# Patient Record
Sex: MALE | Race: WHITE | Employment: OTHER | URBAN - METROPOLITAN AREA
[De-identification: names, ages, dates, MRNs, and addresses within clinical notes are randomized per-mention and may not be internally consistent; named-entity substitution may affect disease eponyms.]

---

## 2017-06-05 ENCOUNTER — HOSPITAL ENCOUNTER (INPATIENT)
Facility: HOSPITAL | Age: 67
LOS: 3 days | Discharge: HOME | DRG: 246 | End: 2017-06-08
Attending: EMERGENCY MEDICINE | Admitting: HOSPITALIST
Payer: MEDICARE

## 2017-06-05 ENCOUNTER — APPOINTMENT (OUTPATIENT)
Dept: GENERAL RADIOLOGY | Facility: HOSPITAL | Age: 67
DRG: 246 | End: 2017-06-05
Attending: EMERGENCY MEDICINE
Payer: MEDICARE

## 2017-06-05 ENCOUNTER — APPOINTMENT (OUTPATIENT)
Dept: INTERVENTIONAL RADIOLOGY/VASCULAR | Facility: HOSPITAL | Age: 67
DRG: 246 | End: 2017-06-05
Attending: INTERNAL MEDICINE
Payer: MEDICARE

## 2017-06-05 DIAGNOSIS — I25.10 CAD (CORONARY ARTERY DISEASE): ICD-10-CM

## 2017-06-05 DIAGNOSIS — I21.4 ACUTE NON-ST ELEVATION MYOCARDIAL INFARCTION (NSTEMI) (HCC): ICD-10-CM

## 2017-06-05 DIAGNOSIS — R07.9 CHEST PAIN: ICD-10-CM

## 2017-06-05 DIAGNOSIS — R07.9 ACUTE CHEST PAIN: Primary | ICD-10-CM

## 2017-06-05 PROCEDURE — 4A023N7 MEASUREMENT OF CARDIAC SAMPLING AND PRESSURE, LEFT HEART, PERCUTANEOUS APPROACH: ICD-10-PCS | Performed by: INTERNAL MEDICINE

## 2017-06-05 PROCEDURE — 027034Z DILATION OF CORONARY ARTERY, ONE ARTERY WITH DRUG-ELUTING INTRALUMINAL DEVICE, PERCUTANEOUS APPROACH: ICD-10-PCS | Performed by: INTERNAL MEDICINE

## 2017-06-05 PROCEDURE — 71010 XR CHEST AP PORTABLE  (CPT=71010): CPT | Performed by: EMERGENCY MEDICINE

## 2017-06-05 PROCEDURE — 99223 1ST HOSP IP/OBS HIGH 75: CPT | Performed by: HOSPITALIST

## 2017-06-05 PROCEDURE — B211YZZ FLUOROSCOPY OF MULTIPLE CORONARY ARTERIES USING OTHER CONTRAST: ICD-10-PCS | Performed by: INTERNAL MEDICINE

## 2017-06-05 PROCEDURE — B240ZZ3 ULTRASONOGRAPHY OF SINGLE CORONARY ARTERY, INTRAVASCULAR: ICD-10-PCS | Performed by: INTERNAL MEDICINE

## 2017-06-05 PROCEDURE — 02C03ZZ EXTIRPATION OF MATTER FROM CORONARY ARTERY, ONE ARTERY, PERCUTANEOUS APPROACH: ICD-10-PCS | Performed by: INTERNAL MEDICINE

## 2017-06-05 RX ORDER — CETIRIZINE HYDROCHLORIDE 10 MG/1
10 TABLET ORAL DAILY
COMMUNITY

## 2017-06-05 RX ORDER — MYCOPHENOLATE MOFETIL 250 MG/1
250 CAPSULE ORAL 2 TIMES DAILY
Status: DISCONTINUED | OUTPATIENT
Start: 2017-06-05 | End: 2017-06-08

## 2017-06-05 RX ORDER — HEPARIN SODIUM AND DEXTROSE 10000; 5 [USP'U]/100ML; G/100ML
1000 INJECTION INTRAVENOUS ONCE
Status: COMPLETED | OUTPATIENT
Start: 2017-06-05 | End: 2017-06-05

## 2017-06-05 RX ORDER — NITROGLYCERIN 0.4 MG/1
0.4 TABLET SUBLINGUAL EVERY 5 MIN PRN
Status: DISCONTINUED | OUTPATIENT
Start: 2017-06-05 | End: 2017-06-08

## 2017-06-05 RX ORDER — MIDAZOLAM HYDROCHLORIDE 1 MG/ML
INJECTION INTRAMUSCULAR; INTRAVENOUS
Status: COMPLETED
Start: 2017-06-05 | End: 2017-06-05

## 2017-06-05 RX ORDER — ATORVASTATIN CALCIUM 40 MG/1
40 TABLET, FILM COATED ORAL NIGHTLY
Status: DISCONTINUED | OUTPATIENT
Start: 2017-06-05 | End: 2017-06-08

## 2017-06-05 RX ORDER — ACETAMINOPHEN 325 MG/1
650 TABLET ORAL EVERY 6 HOURS PRN
Status: DISCONTINUED | OUTPATIENT
Start: 2017-06-05 | End: 2017-06-08

## 2017-06-05 RX ORDER — PRASUGREL 10 MG/1
10 TABLET, FILM COATED ORAL DAILY
Status: DISCONTINUED | OUTPATIENT
Start: 2017-06-06 | End: 2017-06-08

## 2017-06-05 RX ORDER — LIDOCAINE HYDROCHLORIDE 10 MG/ML
INJECTION, SOLUTION INFILTRATION; PERINEURAL
Status: COMPLETED
Start: 2017-06-05 | End: 2017-06-05

## 2017-06-05 RX ORDER — CHOLECALCIFEROL (VITAMIN D3) 50 MCG
2000 TABLET ORAL DAILY
COMMUNITY

## 2017-06-05 RX ORDER — TAMSULOSIN HYDROCHLORIDE 0.4 MG/1
0.4 CAPSULE ORAL DAILY
COMMUNITY

## 2017-06-05 RX ORDER — ALFUZOSIN HYDROCHLORIDE 10 MG/1
10 TABLET, EXTENDED RELEASE ORAL DAILY
Status: DISCONTINUED | OUTPATIENT
Start: 2017-06-06 | End: 2017-06-08

## 2017-06-05 RX ORDER — GABAPENTIN 300 MG/1
100 CAPSULE ORAL 3 TIMES DAILY
Status: ON HOLD | COMMUNITY
End: 2017-06-06

## 2017-06-05 RX ORDER — NITROGLYCERIN 20 MG/100ML
INJECTION INTRAVENOUS CONTINUOUS
Status: DISCONTINUED | OUTPATIENT
Start: 2017-06-05 | End: 2017-06-05

## 2017-06-05 RX ORDER — DEXTROSE MONOHYDRATE 25 G/50ML
50 INJECTION, SOLUTION INTRAVENOUS
Status: DISCONTINUED | OUTPATIENT
Start: 2017-06-05 | End: 2017-06-08

## 2017-06-05 RX ORDER — HEPARIN SODIUM AND DEXTROSE 10000; 5 [USP'U]/100ML; G/100ML
INJECTION INTRAVENOUS CONTINUOUS
Status: CANCELLED | OUTPATIENT
Start: 2017-06-05

## 2017-06-05 RX ORDER — ASPIRIN 81 MG/1
324 TABLET, CHEWABLE ORAL ONCE
Status: COMPLETED | OUTPATIENT
Start: 2017-06-05 | End: 2017-06-05

## 2017-06-05 RX ORDER — GABAPENTIN 100 MG/1
100 CAPSULE ORAL 3 TIMES DAILY
Status: DISCONTINUED | OUTPATIENT
Start: 2017-06-05 | End: 2017-06-08

## 2017-06-05 RX ORDER — TACROLIMUS 1 MG/1
1 CAPSULE ORAL 3 TIMES DAILY
Status: DISCONTINUED | OUTPATIENT
Start: 2017-06-05 | End: 2017-06-07

## 2017-06-05 RX ORDER — PRASUGREL 10 MG/1
TABLET, FILM COATED ORAL
Status: COMPLETED
Start: 2017-06-05 | End: 2017-06-05

## 2017-06-05 RX ORDER — MYCOPHENOLATE MOFETIL 250 MG/1
250 CAPSULE ORAL 2 TIMES DAILY
COMMUNITY

## 2017-06-05 RX ORDER — TACROLIMUS 1 MG/1
2 CAPSULE ORAL EVERY MORNING
COMMUNITY

## 2017-06-05 RX ORDER — INSULIN LISPRO 100 [IU]/ML
INJECTION, SOLUTION INTRAVENOUS; SUBCUTANEOUS
COMMUNITY

## 2017-06-05 RX ORDER — SODIUM CHLORIDE 9 MG/ML
INJECTION, SOLUTION INTRAVENOUS CONTINUOUS
Status: ACTIVE | OUTPATIENT
Start: 2017-06-05 | End: 2017-06-06

## 2017-06-05 RX ORDER — HEPARIN SODIUM 5000 [USP'U]/ML
INJECTION, SOLUTION INTRAVENOUS; SUBCUTANEOUS
Status: COMPLETED
Start: 2017-06-05 | End: 2017-06-05

## 2017-06-05 RX ORDER — NITROGLYCERIN 0.4 MG/1
0.4 TABLET SUBLINGUAL EVERY 5 MIN PRN
COMMUNITY

## 2017-06-05 RX ORDER — CARVEDILOL 6.25 MG/1
6.25 TABLET ORAL 2 TIMES DAILY WITH MEALS
Status: DISCONTINUED | OUTPATIENT
Start: 2017-06-05 | End: 2017-06-08

## 2017-06-05 RX ORDER — ASPIRIN 81 MG/1
81 TABLET ORAL DAILY
Status: DISCONTINUED | OUTPATIENT
Start: 2017-06-06 | End: 2017-06-08

## 2017-06-05 RX ORDER — HEPARIN SODIUM 5000 [USP'U]/ML
5000 INJECTION INTRAVENOUS; SUBCUTANEOUS ONCE
Status: COMPLETED | OUTPATIENT
Start: 2017-06-05 | End: 2017-06-05

## 2017-06-05 RX ORDER — SODIUM CHLORIDE 9 MG/ML
INJECTION, SOLUTION INTRAVENOUS CONTINUOUS
Status: DISCONTINUED | OUTPATIENT
Start: 2017-06-05 | End: 2017-06-05

## 2017-06-05 RX ORDER — ONDANSETRON 2 MG/ML
8 INJECTION INTRAMUSCULAR; INTRAVENOUS EVERY 6 HOURS PRN
Status: DISCONTINUED | OUTPATIENT
Start: 2017-06-05 | End: 2017-06-08

## 2017-06-05 RX ORDER — TACROLIMUS 1 MG/1
1 CAPSULE ORAL NIGHTLY
COMMUNITY

## 2017-06-05 RX ORDER — ATORVASTATIN CALCIUM 40 MG/1
40 TABLET, FILM COATED ORAL NIGHTLY
COMMUNITY

## 2017-06-05 RX ORDER — TRAZODONE HYDROCHLORIDE 50 MG/1
50 TABLET ORAL NIGHTLY PRN
Status: DISCONTINUED | OUTPATIENT
Start: 2017-06-05 | End: 2017-06-08

## 2017-06-05 RX ORDER — FUROSEMIDE 40 MG/1
20 TABLET ORAL 2 TIMES DAILY
COMMUNITY

## 2017-06-05 RX ORDER — FUROSEMIDE 10 MG/ML
INJECTION INTRAMUSCULAR; INTRAVENOUS
Status: COMPLETED
Start: 2017-06-05 | End: 2017-06-05

## 2017-06-05 RX ORDER — CARVEDILOL 6.25 MG/1
6.25 TABLET ORAL 2 TIMES DAILY WITH MEALS
COMMUNITY

## 2017-06-05 NOTE — PROGRESS NOTES
Reviewed with Dr. Gofdrey Carlin. Discussed with patient. Reviewed procedure along with R/B/A - including no intervention -- he appears to have a good understanding.     Prelim cath    No LV given CKD -- EDP 20    LM/LAD patent  LCX - dominant artery -- 99% thr

## 2017-06-05 NOTE — CONSULTS
[facility]    PATIENT'S NAME: Madhuri Cook   ATTENDING PHYSICIAN: ESME Mart: Timothy Hutchinson M.D.    PATIENT ACCOUNT#:   [de-identified]    LOCATION:  [facility] 3 EDWP 10  MEDICAL RECORD #:   [id number]       DATE OF BIR rest of the review of systems is negative x10. PHYSICAL EXAMINATION:    VITAL SIGNS:  Temperature 98.5, pulse 64, respirations 16, blood pressure 118/61, 94% saturation on room air. NECK:  No carotid bruits. No increased jugular venous pressure.

## 2017-06-05 NOTE — CONSULTS
Cardiology Consult Note     PRIMARY CARDIOLOGIST: [facility]      CONSULT FOR: MI, LATE PRESENTATION WITH ONGOING CHEST PAIN, KNOWN CAD WITH PREVIOUS CAD/STENTS IN SEPT,  HX OF RENAL TRANSPLANT        HISTORY: 76 Y/O MALE WITH KNOWN IDDM, HTN, PVD WITH PREVIOUS R

## 2017-06-05 NOTE — H&P
[facility]  History and Physical     Jenn Cassie Patient Status:  Emergency    1950 MRN [id number]   Location [address] [facility] Attending Ny Gomez MD   Hosp Day # 0 PCP None Pcp     Chief Complaint: sopb Anicteric. Neck: No lymphadenopathy. No JVD. No carotid bruits. Respiratory: Clear to auscultation bilaterally. No wheezes. No rhonchi. Cardiovascular: S1, S2. Regular rate and rhythm. No murmurs, rubs or gallops. Equal pulses.    Chest and Back: No tend

## 2017-06-05 NOTE — ED PROVIDER NOTES
Patient Seen in: [facility] Emergency Department    History   Patient presents with:  Chest Pain Angina (cardiovascular)    Stated Complaint: CHEST PAIN    HPI    This is a pleasant 78-year-old gentleman, with complaints of chest pain.   He says he h Cap,  Take by mouth daily. ticagrelor (BRILINTA) 90 MG Oral Tab,  Take 180 mg by mouth every 12 (twelve) hours. mycophenolate mofetil 250 MG Oral Cap,  Take 500 mg by mouth 2 (two) times daily. No family history on file.       Smoking Status: Purnima Arnold Creatinine 1.79 (*)     GFR 39 (*)     AST 77 (*)     Albumin 3.2 (*)     Chloride 114 (*)     CO2 18.0 (*)     All other components within normal limits   TROPONIN I - Abnormal; Notable for the following:     Troponin 17.300 (*)     All other components w intervention, complex decision making, and/or extensive discussions with the patient, family, and clinical staff.         Disposition and Plan     Clinical Impression:  Acute chest pain  (primary encounter diagnosis)  Acute non-ST elevation myocardial infar

## 2017-06-06 ENCOUNTER — APPOINTMENT (OUTPATIENT)
Dept: CV DIAGNOSTICS | Facility: HOSPITAL | Age: 67
DRG: 246 | End: 2017-06-06
Attending: INTERNAL MEDICINE
Payer: MEDICARE

## 2017-06-06 PROCEDURE — 99232 SBSQ HOSP IP/OBS MODERATE 35: CPT | Performed by: HOSPITALIST

## 2017-06-06 PROCEDURE — 93306 TTE W/DOPPLER COMPLETE: CPT | Performed by: INTERNAL MEDICINE

## 2017-06-06 RX ORDER — FAMOTIDINE 20 MG/1
20 TABLET ORAL 2 TIMES DAILY
COMMUNITY

## 2017-06-06 RX ORDER — HEPARIN SODIUM 5000 [USP'U]/ML
5000 INJECTION, SOLUTION INTRAVENOUS; SUBCUTANEOUS EVERY 12 HOURS SCHEDULED
Status: DISCONTINUED | OUTPATIENT
Start: 2017-06-07 | End: 2017-06-07

## 2017-06-06 RX ORDER — GABAPENTIN 100 MG/1
100 CAPSULE ORAL 3 TIMES DAILY
COMMUNITY

## 2017-06-06 NOTE — PLAN OF CARE
CARDIOVASCULAR - ADULT    • Maintains optimal cardiac output and hemodynamic stability Progressing    • Absence of cardiac arrhythmias or at baseline Progressing        Received patient at 1630 awake and alert from cath lab. Right groin dry and intact.  Pu

## 2017-06-06 NOTE — PROCEDURES
[facility]    PATIENT'S NAME: Melanie Mondragon   ATTENDING PHYSICIAN: Juan J Blanton M.D. OPERATING PHYSICIAN: James Powers M.D.    PATIENT ACCOUNT#:   [de-identified]    LOCATION:  [address] [place] [address]  MEDICAL RECORD #:   [id number]       DATE OF BIRTH: artery. HEMODYNAMICS:  Left ventricular end-diastolic pressure was 20 mmHg. CORONARY ARTERIOGRAPHY:  The coronary circulation was codominant and heavily calcified.     The left main coronary artery had no angiographic evidence of significant obstructi angiographic and intravascular ultrasound findings, subsequent balloon dilatation was performed throughout with a 3.25 balloon to high atmospheric pressure. The proximal vessel was then stented with a 3.0 x 12 mm Synergy drug-eluting stent.   This too w

## 2017-06-06 NOTE — DIETARY NOTE
Nutrition Short Note    Dietitian consult received per cardiac rehab/CHF protocol. Pt to be educated by cardiac rehab staff and encouraged to attend outpatient classes taught by RD. ARVIND available PRN.     Tila Lozano MA, RD, LDN

## 2017-06-06 NOTE — PLAN OF CARE
CARDIOVASCULAR - ADULT    • Absence of cardiac arrhythmias or at baseline Progressing        Diabetes/Glucose Control    • Glucose maintained within prescribed range Progressing        Patient/Family Goals    • Patient/Family Long Term Goal Progressing

## 2017-06-06 NOTE — PROGRESS NOTES
[facility]  Progress note     Anna Holiday Patient Status:  Emergency    1950 MRN [id number]   Location [address] Attending Alessia Rg MD   [facility] Day # 1 PCP None Pcp     Chief Complaint: sopb    [place] circ.    1. Suspected NSTEMI-continue asa/prasugrel per cards; POD#1 s/p pci/argelia to circ  2.  ESRD due to DMII s/p renal transplant-resume mycoophenolate and tacrolimus; tacro level pending-monitor Cr carefully given contrast exposure; assumed baseline arou

## 2017-06-07 ENCOUNTER — APPOINTMENT (OUTPATIENT)
Dept: GENERAL RADIOLOGY | Facility: HOSPITAL | Age: 67
DRG: 246 | End: 2017-06-07
Attending: HOSPITALIST
Payer: MEDICARE

## 2017-06-07 PROCEDURE — 99222 1ST HOSP IP/OBS MODERATE 55: CPT | Performed by: INTERNAL MEDICINE

## 2017-06-07 PROCEDURE — 99233 SBSQ HOSP IP/OBS HIGH 50: CPT | Performed by: HOSPITALIST

## 2017-06-07 PROCEDURE — 71010 XR CHEST AP PORTABLE  (CPT=71010): CPT | Performed by: HOSPITALIST

## 2017-06-07 RX ORDER — TACROLIMUS 1 MG/1
1 CAPSULE ORAL ONCE
Status: COMPLETED | OUTPATIENT
Start: 2017-06-07 | End: 2017-06-07

## 2017-06-07 RX ORDER — FUROSEMIDE 20 MG/1
20 TABLET ORAL
Status: DISCONTINUED | OUTPATIENT
Start: 2017-06-07 | End: 2017-06-08

## 2017-06-07 RX ORDER — TACROLIMUS 1 MG/1
2 CAPSULE ORAL EVERY MORNING
Status: DISCONTINUED | OUTPATIENT
Start: 2017-06-08 | End: 2017-06-08

## 2017-06-07 RX ORDER — TACROLIMUS 1 MG/1
1 CAPSULE ORAL EVERY EVENING
Status: DISCONTINUED | OUTPATIENT
Start: 2017-06-07 | End: 2017-06-08

## 2017-06-07 NOTE — CONSULTS
[facility]  Report of Consultation    Sadi Rogers Patient Status:  Inpatient    1950 MRN [id number]   [facility] 8NE-A Attending Nelli Walton MD   Hosp Day # 2 PCP None Pcp     Reason for Consultation:  CKD 4 s/p CRTx    H injection 8 mg, 8 mg, Intravenous, Q6H PRN  •  glucose (DEX4) oral liquid 15 g, 15 g, Oral, Q15 Min PRN **OR** Glucose-Vitamin C (DEX-4) 4-0.006 g chewable tab 4 tablet, 4 tablet, Oral, Q15 Min PRN **OR** dextrose 50% injection 50 mL, 50 mL, Intravenous, Q extremities  Skin: Warm and dry, no rashes      Laboratory Data:    Lab Results  Component Value Date   WBC 6.4 06/07/2017   HGB 9.3 06/07/2017   HCT 30.9 06/07/2017   PLT 99.0 06/07/2017   CREATSERUM 2.04 06/07/2017   BUN 68 06/07/2017    06/07/2017 bid    #4. CAD- s/p PCI. Per cards    D/W pt at length    Thank you for allowing me to participate in the care of your patient. Please do not hesitate to call with any questions or concerns.        Aime West  6/7/2017  10:49 AM

## 2017-06-07 NOTE — PROGRESS NOTES
[facility]  Progress note     Yolanda Bailey Patient Status:  Emergency    1950 MRN [id number]   Location [facility] [facility] Attending Sai Oliveira MD   Hosp Day # 2 PCP None Pcp     Chief Complaint: CAD sp PCI 9/2016  3. Dyslipidemia  1. Aspirin  2. Effient  3. Coreg  4. Lipitor  5. Telemetry  6. Cardiology on consult  4. Dyspnea, diuretics on hold, suspect volume overload  1. Check CXR  2. Diuretics if nephrologist in agreement  5.  ESRD due to diabetes mellitus

## 2017-06-07 NOTE — PROGRESS NOTES
Cardiology Progress Note     PRIMARY CARDIOLOGIST: [facility]/DIS[facility]ER      CONSULT FOR:LATE PRESENTATION FOR INF/LAT MI,  S/P LASER/PTCA OF PREVIOUS CIRC STENT.  EF AT 45 % WITH HYPOKINESIS OF INF/LATERAL      SUBJECTIVE: NO CHEST PAIN, NOTED SOB      Scheduled Me Neurologic: CNII-XII intact. Normal strength and sensation throughout.        Data Review:     HEM: Recent Labs   Lab  06/05/17   1232  06/06/17   0456  06/07/17   0515   WBC  9.2  10.9  6.4   HGB  10.2*  9.7*  9.3*   PLT  126.0*  118.0*  99.0*       Chem

## 2017-06-07 NOTE — PROGRESS NOTES
Brief note:    Recent Labs   Lab  06/05/17   1232  06/06/17   0456  06/07/17   0515   GLU  160*  104*  146*   BUN  52*  55*  68*   CREATSERUM  1.79*  1.83*  2.04*   CA  9.7  9.6  9.4   NA  139  142  143   K  5.0  5.0  5.4*   CL  114*  116*  115*   CO2  18.

## 2017-06-07 NOTE — PLAN OF CARE
Assumed care of patient around 0730. Pt a/o x 4, RA, NSR w/ 1 AVB. Lungs clear bilaterally. Denies chest pain/discomfort, SOB at present. Nephrology on consult d/t elevated creatinine this a.m.  L BKA, R partial foot amputation.    Old Left arm fistu

## 2017-06-08 VITALS
RESPIRATION RATE: 20 BRPM | HEIGHT: 72 IN | OXYGEN SATURATION: 95 % | DIASTOLIC BLOOD PRESSURE: 58 MMHG | WEIGHT: 209.31 LBS | HEART RATE: 58 BPM | BODY MASS INDEX: 28.35 KG/M2 | TEMPERATURE: 98 F | SYSTOLIC BLOOD PRESSURE: 132 MMHG

## 2017-06-08 PROCEDURE — 99239 HOSP IP/OBS DSCHRG MGMT >30: CPT | Performed by: HOSPITALIST

## 2017-06-08 PROCEDURE — 99232 SBSQ HOSP IP/OBS MODERATE 35: CPT | Performed by: INTERNAL MEDICINE

## 2017-06-08 RX ORDER — PRASUGREL 10 MG/1
10 TABLET, FILM COATED ORAL DAILY
Qty: 30 TABLET | Refills: 11 | Status: SHIPPED | OUTPATIENT
Start: 2017-06-08

## 2017-06-08 RX ORDER — FUROSEMIDE 20 MG/1
20 TABLET ORAL
Status: DISCONTINUED | OUTPATIENT
Start: 2017-06-08 | End: 2017-06-08

## 2017-06-08 NOTE — PROGRESS NOTES
ALERT AND OX3,TOLERATED AMBULATION WITHOUT ANY PROBLEM,DC INSTRUCTION GIVEN,RX GIVEN-VERBALIZED UNDERSTANDING,DC TELE,DC HL.

## 2017-06-08 NOTE — PROGRESS NOTES
[facility]  Cardiology Progress Note    Rafal Briscoe Patient Status:  Inpatient    1950 MRN [id number]   [facility] 8NE-A Attending Lawrence Joseph MD   Hosp Day # 3 PCP None Pcp     Subjective:  Did not sleep well overnight d LVEF 45%  2. History of CAD, PCI  3. Dyslipidemia  4. ESRD, history of renal transplant  5. DM  6. Thrombocytopenia, HIPA negative, heparin discontinued      Plan:   1. Agree with lasix per renal  2. Check EKG  3.  Continue ASA/Prasugrel (DAPT), BB, statin

## 2017-06-08 NOTE — PROGRESS NOTES
[facility]  Progress note     Maria Antonia Camarillo Patient Status:  Emergency    1950 MRN [id number]   Location [address] [facility] Attending Lexa Wang MD   Hosp Day # 3 PCP None Pcp     Chief Complaint: CAD sp PCI 9/2016  3. Dyslipidemia  1. Aspirin  2. Effient  3. Coreg  4. Lipitor  5. Telemetry  6. Cardiology on consult  4. Dyspnea d/t volume overload  1. Lasix 20 BID  2. Monitor I/O  5.  ESRD due to diabetes mellitus sp renal transplant with acute injury, possible

## 2017-06-08 NOTE — PROGRESS NOTES
CARDIOVASCULAR - ADULT      •  Maintains optimal cardiac output and hemodynamic stability  Progressing      •  Absence of cardiac arrhythmias or at baseline  Progressing            Diabetes/Glucose Control      •  Glucose maintained within prescribed range

## 2017-06-08 NOTE — PROGRESS NOTES
[facility]  Nephrology Progress Note    Kishan Fill Patient Status:  Inpatient    1950 MRN [id number]   [facility] Vishnu Hitchcock MD   Hosp Day # 3 PCP None Pcp       SUBJECTIVE:  Patient complained of shortn Labs   Lab  06/05/17   1232  06/07/17   0515   ALT  26  23   AST  77*  49*   ALB  3.2*  2.9*       Recent Labs   Lab  06/07/17   0803  06/07/17   1237  06/07/17   1735  06/07/17   2125  06/08/17   0822   PGLU  181*  203*  241*  177*  210*       Meds:     C tolerated intravenous contrast without significant incident. Lasix has been resumed and we will continue to monitor the renal function.     #2.  Status post cadaveric renal transplant-as mentioned his renal function is stable and he will continue his curre

## 2017-06-08 NOTE — DISCHARGE SUMMARY
[facility]PITAL[facility]  DISCHARGE SUMMARY     Venu New Patient Status:  Inpatient    1950 MRN [id number]   [facility] 8NE-A Attending Gloria Solano MD   [facility] Day # 3 PCP None Pcp     Date of Admission: 2017  Date of Discharge today.    1. NSTEMI sp KELLY LCx  2. History of CAD sp PCI 9/2016  3. Dyslipidemia  1. Aspirin  2. Effient  3. Coreg  4. Lipitor  4. Dyspnea d/t volume overload  1. Lasix 20 BID  5.  ESRD due to diabetes mellitus sp renal transplant with acute injury, possibl pain.    Refills:  0       tamsulosin HCl 0.4 MG Caps   Commonly known as:  FLOMAX        Take 0.4 mg by mouth daily. Refills:  0       Vitamin D 2000 units Tabs        Take 2,000 mg by mouth daily.     Refills:  0         STOP taking these medications

## 2017-06-12 ENCOUNTER — TELEPHONE (OUTPATIENT)
Dept: CARDIAC REHAB | Facility: HOSPITAL | Age: 67
End: 2017-06-12

## 2017-06-12 NOTE — PROGRESS NOTES
[facility] Follow up Call  1. How are you doing now that you're home? Pretty well, and I am still at my son's place, but intend to go back to [place] soon. 2. Since leaving the hospital, have you been able to get your prescriptions filled? Yes, but one problem. They prescribed Effient and my insurance doesn't cover it. I talked with my MD back home, and I am continuing on the Brilinta that I was already taking. Patient to again follow-up with Dr. Brando Stearns office/NP to inform them, and get further recommendations. 3. Do you have any questions about your medications? No      4. Have you experienced any significate changes since going home? No    5. Are you walking at home more and more each day? Yes, but inside only as it is too hot outside. 6. Do you have appointments with your primary MD (1 week), cardiologist (2 weeks)? No, I am going to follow up with my MD in [place] once back there. Advised patient of importance in seeing cardiologist within 2 weeks of discharge. 7. Do you have an appointment made to start Cardiopulmonary rehab? (2-3 weeks)? No, once back home I will contact CR there. 8. Are there any other questions or concerns you have today?  ***

## 2019-05-28 NOTE — PROGRESS NOTES
Cardiology Progress Note     PRIMARY CARDIOLOGIST: ALEKSANDAR      CONSULT FOR: S/P INF/LATERAL MI AND CIRC STENT CLOSURE      SUBJECTIVE: NO CHEST PAIN, NO GROIN PAIN      Scheduled Meds:   • Alfuzosin HCl ER  10 mg Oral Daily   • tacrolimus  1 mg Oral TID   • m Pomary with Edith. [place] appointment 6/21/19, 4:00.   HEM: Recent Labs   Lab  06/05/17   1232  06/06/17   0456   WBC  9.2  10.9   HGB  10.2*  9.7*   PLT  126.0*  118.0*       Chem: Recent Labs   Lab  06/05/17   1232  06/06/17   0456   NA  139  142   K  5.0  5.0   CL  114*  116*   CO2  18.0*  20.0*   BUN  52*

## 2022-01-09 NOTE — CERTIFICATION
**Certification      PHYSICIAN Certification of Need for Inpatient Hospitalization - Initial Certification    Patient will require inpatient services that will reasonably be expected to span two midnight's based on the clinical documentation in H+P.    [facility]     [address] [place] [address]    Phone:[phone]

## (undated) NOTE — IP AVS SNAPSHOT
[facility] [place]  [address] [place], [address] ~ [phone]                Discharge Summary   6/5/2017    [facility]           Admission Information        Provider Department    6/5/2017 Attila Herring MD  8ne-A         T Commonly known as:  ZYRTEC   Next dose due:  AM        Take 10 mg by mouth daily. famoTIDine 20 MG Tabs   Commonly known as:  PEPCID   Next dose due:  TONIGHT        Take 20 mg by mouth 2 (two) times daily. Next dose due:  AM        Take 2,000 mg by mouth daily.                               STOP taking these medications     BRILINTA 90 MG Tabs   Generic drug:  ticagrelor                Where to Get Your Medications      Please  your prescriptions at th 4.25 (06/08/17)  0.89 (L) (06/08/17)  0.67 (H) (06/08/17)  0.09 (06/08/17)  0.01    (06/07/17)  71.5 (06/07/17)  14.2 (06/07/17)  13.2 (06/07/17)  0.5 (06/07/17)  0.3  (06/07/17)  4.54 (06/07/17)  0.90 (06/07/17)  0.84 (H) (06/07/17)  0.03 (06/07/17)  0.02 visit,  view other health information, and more. To sign up or find more information, go to [email or web address] and click on the Sign Up Now link in the [employer] box.      Enter your A[employer] Activation Code exactly as it appears below along with yo What to report to your healthcare team: Dizziness, decreased urine amount           Cholesterol Lowering Medications     atorvastatin 40 MG Oral Tab       Use: Lower cholesterol, protect your heart   Most common side effects: Dizziness, constipation, abnor What to report to your healthcare team: Pain, nausea/vomiting, no bowel movement in 2+ days, diarrhea           Anti-Histamines     cetirizine 10 MG Oral Tab         Use:  Treat Allergies   Most common side effects:  Drowsiness, dry mouth   What to report